# Patient Record
Sex: MALE | Race: WHITE | Employment: OTHER | URBAN - METROPOLITAN AREA
[De-identification: names, ages, dates, MRNs, and addresses within clinical notes are randomized per-mention and may not be internally consistent; named-entity substitution may affect disease eponyms.]

---

## 2018-07-16 ENCOUNTER — HOSPITAL ENCOUNTER (EMERGENCY)
Facility: CLINIC | Age: 53
Discharge: HOME | End: 2018-07-16
Attending: EMERGENCY MEDICINE | Admitting: EMERGENCY MEDICINE

## 2018-07-16 VITALS
RESPIRATION RATE: 12 BRPM | TEMPERATURE: 97.8 F | DIASTOLIC BLOOD PRESSURE: 75 MMHG | SYSTOLIC BLOOD PRESSURE: 125 MMHG | OXYGEN SATURATION: 96 % | HEART RATE: 75 BPM

## 2018-07-16 DIAGNOSIS — T78.40XA ALLERGIC REACTION, INITIAL ENCOUNTER: ICD-10-CM

## 2018-07-16 DIAGNOSIS — T63.441A BEE STING REACTION, ACCIDENTAL OR UNINTENTIONAL, INITIAL ENCOUNTER: ICD-10-CM

## 2018-07-16 LAB
ALBUMIN SERPL-MCNC: 4.1 G/DL (ref 3.4–5)
ALP SERPL-CCNC: 95 U/L (ref 40–150)
ALT SERPL W P-5'-P-CCNC: 28 U/L (ref 0–70)
ANION GAP SERPL CALCULATED.3IONS-SCNC: 9 MMOL/L (ref 3–14)
AST SERPL W P-5'-P-CCNC: 23 U/L (ref 0–45)
BASOPHILS # BLD AUTO: 0.1 10E9/L (ref 0–0.2)
BASOPHILS NFR BLD AUTO: 1 %
BILIRUB SERPL-MCNC: 0.9 MG/DL (ref 0.2–1.3)
BUN SERPL-MCNC: 22 MG/DL (ref 7–30)
CALCIUM SERPL-MCNC: 8.9 MG/DL (ref 8.5–10.1)
CHLORIDE SERPL-SCNC: 109 MMOL/L (ref 94–109)
CO2 SERPL-SCNC: 23 MMOL/L (ref 20–32)
CREAT SERPL-MCNC: 1.05 MG/DL (ref 0.66–1.25)
DIFFERENTIAL METHOD BLD: ABNORMAL
EOSINOPHIL # BLD AUTO: 0 10E9/L (ref 0–0.7)
EOSINOPHIL NFR BLD AUTO: 0 %
ERYTHROCYTE [DISTWIDTH] IN BLOOD BY AUTOMATED COUNT: 13.2 % (ref 10–15)
GFR SERPL CREATININE-BSD FRML MDRD: 73 ML/MIN/1.7M2
GLUCOSE SERPL-MCNC: 140 MG/DL (ref 70–99)
HCT VFR BLD AUTO: 43.6 % (ref 40–53)
HGB BLD-MCNC: 14.4 G/DL (ref 13.3–17.7)
LYMPHOCYTES # BLD AUTO: 5.4 10E9/L (ref 0.8–5.3)
LYMPHOCYTES NFR BLD AUTO: 46 %
MCH RBC QN AUTO: 31.3 PG (ref 26.5–33)
MCHC RBC AUTO-ENTMCNC: 33 G/DL (ref 31.5–36.5)
MCV RBC AUTO: 95 FL (ref 78–100)
MONOCYTES # BLD AUTO: 0.6 10E9/L (ref 0–1.3)
MONOCYTES NFR BLD AUTO: 5 %
NEUTROPHILS # BLD AUTO: 5.6 10E9/L (ref 1.6–8.3)
NEUTROPHILS NFR BLD AUTO: 48 %
NT-PROBNP SERPL-MCNC: 151 PG/ML (ref 0–900)
PLATELET # BLD AUTO: 270 10E9/L (ref 150–450)
PLATELET # BLD EST: ABNORMAL 10*3/UL
POTASSIUM SERPL-SCNC: 3.2 MMOL/L (ref 3.4–5.3)
PROT SERPL-MCNC: 7.8 G/DL (ref 6.8–8.8)
RBC # BLD AUTO: 4.6 10E12/L (ref 4.4–5.9)
RBC MORPH BLD: ABNORMAL
SODIUM SERPL-SCNC: 141 MMOL/L (ref 133–144)
TROPONIN I SERPL-MCNC: <0.015 UG/L (ref 0–0.04)
WBC # BLD AUTO: 11.7 10E9/L (ref 4–11)

## 2018-07-16 PROCEDURE — 99284 EMERGENCY DEPT VISIT MOD MDM: CPT | Mod: 25 | Performed by: EMERGENCY MEDICINE

## 2018-07-16 PROCEDURE — 83880 ASSAY OF NATRIURETIC PEPTIDE: CPT | Performed by: EMERGENCY MEDICINE

## 2018-07-16 PROCEDURE — 84484 ASSAY OF TROPONIN QUANT: CPT | Performed by: EMERGENCY MEDICINE

## 2018-07-16 PROCEDURE — 96365 THER/PROPH/DIAG IV INF INIT: CPT

## 2018-07-16 PROCEDURE — 25000128 H RX IP 250 OP 636: Performed by: EMERGENCY MEDICINE

## 2018-07-16 PROCEDURE — 25000125 ZZHC RX 250: Performed by: EMERGENCY MEDICINE

## 2018-07-16 PROCEDURE — 93005 ELECTROCARDIOGRAM TRACING: CPT

## 2018-07-16 PROCEDURE — 85025 COMPLETE CBC W/AUTO DIFF WBC: CPT | Performed by: EMERGENCY MEDICINE

## 2018-07-16 PROCEDURE — 93010 ELECTROCARDIOGRAM REPORT: CPT | Mod: Z6 | Performed by: EMERGENCY MEDICINE

## 2018-07-16 PROCEDURE — 80053 COMPREHEN METABOLIC PANEL: CPT | Performed by: EMERGENCY MEDICINE

## 2018-07-16 PROCEDURE — 99284 EMERGENCY DEPT VISIT MOD MDM: CPT | Mod: 25

## 2018-07-16 PROCEDURE — 96375 TX/PRO/DX INJ NEW DRUG ADDON: CPT

## 2018-07-16 RX ORDER — DIPHENHYDRAMINE HYDROCHLORIDE 50 MG/ML
50 INJECTION INTRAMUSCULAR; INTRAVENOUS ONCE
Status: COMPLETED | OUTPATIENT
Start: 2018-07-16 | End: 2018-07-16

## 2018-07-16 RX ORDER — ONDANSETRON 2 MG/ML
4 INJECTION INTRAMUSCULAR; INTRAVENOUS EVERY 30 MIN PRN
Status: DISCONTINUED | OUTPATIENT
Start: 2018-07-16 | End: 2018-07-16 | Stop reason: HOSPADM

## 2018-07-16 RX ORDER — EPINEPHRINE 0.3 MG/.3ML
0.3 INJECTION SUBCUTANEOUS
Qty: 0.6 ML | Refills: 1 | Status: SHIPPED | OUTPATIENT
Start: 2018-07-16

## 2018-07-16 RX ORDER — METHYLPREDNISOLONE SODIUM SUCCINATE 125 MG/2ML
125 INJECTION, POWDER, LYOPHILIZED, FOR SOLUTION INTRAMUSCULAR; INTRAVENOUS ONCE
Status: COMPLETED | OUTPATIENT
Start: 2018-07-16 | End: 2018-07-16

## 2018-07-16 RX ADMIN — FAMOTIDINE 20 MG: 20 INJECTION, SOLUTION INTRAVENOUS at 13:19

## 2018-07-16 RX ADMIN — DIPHENHYDRAMINE HYDROCHLORIDE 50 MG: 50 INJECTION, SOLUTION INTRAMUSCULAR; INTRAVENOUS at 13:08

## 2018-07-16 RX ADMIN — METHYLPREDNISOLONE SODIUM SUCCINATE 125 MG: 125 INJECTION, POWDER, FOR SOLUTION INTRAMUSCULAR; INTRAVENOUS at 13:12

## 2018-07-16 ASSESSMENT — ENCOUNTER SYMPTOMS
VOMITING: 0
SHORTNESS OF BREATH: 0
WHEEZING: 0
NAUSEA: 1

## 2018-07-16 NOTE — ED PROVIDER NOTES
History     Chief Complaint   Patient presents with     Allergic Reaction     bee sting, used epi pen, chest and loss of vision reported on arrival, stung 1 hour epi pen used prior to arrival 15 min ago     HPI  Norman Grijalva is a 52 year old male who presents to the Emergency Department with concern for possible allergic reaction. Patient was stung by a bee just prior to arrival between the 4th and 5th finger on the right hand. Patient has a known history of allergy to bees. He had temporary tunnel vision when he was first stung, and then administered his epi-pen around 12:45 pm, with improvement in the vision change. He reports feeling thirsty, with a dry mouth and complains of numbness in his tongue and lips. He denies throat swelling, shortness of breath, difficulty speaking or swallowing. He notes a mild mid-sternal chest pressure which is improving and mild nausea. Overall patient notes improvement since administering epi-pen.     Problem List:    Patient Active Problem List    Diagnosis Date Noted     CARDIOVASCULAR SCREENING; LDL GOAL LESS THAN 130 09/18/2013     Priority: Medium        Past Medical History:    No past medical history on file.    Past Surgical History:    Past Surgical History:   Procedure Laterality Date     CHOLECYSTECTOMY  2004       Family History:    Family History   Problem Relation Age of Onset     Diabetes Father        Social History:  Marital Status:  Single [1]  Social History   Substance Use Topics     Smoking status: Current Every Day Smoker     Packs/day: 0.50     Smokeless tobacco: Never Used     Alcohol use No        Medications:      NO ACTIVE MEDICATIONS       Review of Systems   Constitutional: Positive for appetite change. Negative for chills and fever.   HENT: Negative for congestion, facial swelling and trouble swallowing.    Eyes: Positive for visual disturbance (temporary tunnel vision, resolved).   Respiratory: Negative for shortness of breath and wheezing.     Cardiovascular: Negative for leg swelling.        Mid-sternal chest pressure   Gastrointestinal: Positive for nausea. Negative for abdominal pain and vomiting.   Genitourinary: Negative for decreased urine volume and dysuria.   Musculoskeletal: Negative for back pain, myalgias and neck pain.   Skin: Negative for rash.   Allergic/Immunologic: Positive for environmental allergies (bee sting).   Neurological: Negative for dizziness, weakness, light-headedness, numbness and headaches.   Hematological: Does not bruise/bleed easily.   Psychiatric/Behavioral: Negative for confusion.       Physical Exam   BP: 122/73  Pulse: 75  Temp: 97.8  F (36.6  C)  Resp: 16  SpO2: 93 %    Physical Exam   Constitutional: He appears well-developed and well-nourished.   Patient appears anxious   Psychiatric: He has a normal mood and affect.   Nursing note and vitals reviewed.    HENT: Oral mucosa moist. No lesions. Posterior pharynx with no erythema or edema.   Neck: Supple, no swelling is noted.   Pulmonary/Chest: Lungs are clear to auscultation bilaterally, slightly decreased at bases.   Cardiovascular: Heart is regular rate and rhythm. No murmur.  Abdomen: Soft, non-distended, non-tender.   Musculoskeletal: Moving all extremities well. No peripheral edema. Bee sting site noted between 3rd and 4th digit on right hand without evidence of foreign body.   Neurological: Alert. No focal neurologic deficit. CN II-XII intact.  Skin: No rash.    ED Course     ED Course     Procedures               Critical Care time: none          EKG Interpretation:      Interpreted by Kendrick Barajas  Rhythm: Normal sinus   Rate: Normal  Axis: Normal  Ectopy: None  Conduction: Normal  ST Segments/ T Waves: No ST-T wave changes and No acute ischemic changes  Q Waves: None  Comparison to prior: No old EKG available    Clinical Impression: normal EKG              Labs Ordered and Resulted from Time of ED Arrival Up to the Time of Departure from the ED    CBC WITH PLATELETS DIFFERENTIAL - Abnormal; Notable for the following:        Result Value    WBC 11.7 (*)     Absolute Lymphocytes 5.4 (*)     All other components within normal limits   COMPREHENSIVE METABOLIC PANEL - Abnormal; Notable for the following:     Potassium 3.2 (*)     Glucose 140 (*)     All other components within normal limits   NT PROBNP INPATIENT   TROPONIN I       Medications   diphenhydrAMINE (BENADRYL) injection 50 mg (50 mg Intravenous Given 7/16/18 1308)   methylPREDNISolone sodium succinate (solu-MEDROL) injection 125 mg (125 mg Intravenous Given 7/16/18 1312)       1:03 PM Patient assessed     Assessments & Plan (with Medical Decision Making) Records were reviewed. Labs were obtained and patient was given benadryl and solumedrol. White count was mildly elevated . Comprehensive metabolic panel with acute abnormality. BNP was within normal limits and troponin was negative. Due to initial chest pressure an ekg was obtained and was unremarkable. Patient was observed for 2 hour with relief of symptoms. He is requesting to go home at thist time. On re-exam he has no throat swelling his lungs are clear and there is stable vitals sign . He has not rash. She will continue with benadryl and return if symptoms worsen or new symptoms develop.      I have reviewed the nursing notes.    I have reviewed the findings, diagnosis, plan and need for follow up with the patient.       Discharge Medication List as of 7/16/2018  3:02 PM      START taking these medications    Details   EPINEPHrine (EPIPEN/ADRENACLICK/OR ANY BX GENERIC EQUIV) 0.3 MG/0.3ML injection 2-pack Inject 0.3 mLs (0.3 mg) into the muscle once as needed for anaphylaxis, Disp-0.6 mL, R-1, Local Print             Final diagnoses:   Bee sting reaction, accidental or unintentional, initial encounter   Allergic reaction, initial encounter     This document serves as a record of the services and decisions personally performed and made by Jenn  Kendrick Montes MD. It was created on his behalf by Gabby Castillo, a trained medical scribe. The creation of this document is based the provider's statements to the medical scribe.  Gabby Castillo 1:21 PM 7/16/2018    Provider:   The information in this document, created by the medical scribe for me, accurately reflects the services I personally performed and the decisions made by me. I have reviewed and approved this document for accuracy prior to leaving the patient care area.  Kendrick Barajas MD 1:21 PM 7/16/2018 7/16/2018   [facility] EMERGENCY DEPARTMENT     Kendrick Barajas MD  07/17/18 7598

## 2018-07-16 NOTE — DISCHARGE INSTRUCTIONS
"Return if symptoms worsen or new symptoms develop.  Follow-up with primary care physician next available.  Drink plenty of fluids.  Take Benadryl as needed for itching and reactive further reaction.  If increased shortness of breath throat swelling or other symptoms present please return for recheck.  Use EpiPen as needed.  Insect, Spider, and Scorpion Bites and Stings  Most insect bites are harmless and cause only minor swelling or itching. But if you re allergic to insects such as wasps or bees, a sting can cause a life-threatening allergic reaction. Some ticks can carry and transmit serious diseases. The venom (poison) from scorpions and certain spiders can also be deadly, although this is rare. Knowing when to seek emergency care could save your life.     The black  (top) and brown recluse (bottom) are two poisonous spiders found in the [place].   When to go to the emergency room (ER)    Scorpion sting    Bite from a black, red, or brown  spider or brown recluse spider    Severe pain or swelling at the site of bite    A tick that is embedded in your skin and can not be easily removed at home    Signs of an allergic reaction such as:  ? Hives  ? Swelling of your eyes, lips, or the inside of your throat  ? Trouble breathing  ? Dizziness or confusion  What to expect in the ER    If you re having trouble breathing, you ll be given oxygen through a mask. In case of severe breathing difficulty, you may have a tube inserted in your throat and be placed on a ventilator (breathing machine).    If you are having a severe allergic reaction from a sting (called anaphylaxis), you may be given a shot of epinephrine. If it is known that you are allergic to bee or wasp stings, your doctor may give you a prescription for an \"epi-pen\" that you can keep with you at all times in case of a sting.    You may receive antivenin (a substance that reverses the effects of poison) for some spider bites and scorpion stings. " Because antivenin can sometimes cause other problems, your doctor will weigh the risks and benefits of this treatment.    Steroids such as prednisone are often used to treat allergic reactions. In many cases, your doctor will also prescribe an antihistamine to help relieve itching.  Easing symptoms of an insect bite or sting    Try to remove a stinger you can see. Use your fingernail, a knife edge, or credit card to scrape against the skin. Do not squeeze or pull.    Apply ice or a cold compress to reduce pain and swelling (keep a thin cloth between the cold source and the skin).   Date Last Reviewed: 12/1/2016 2000-2017 [employer]. [address] [place] [address], [place] [address]. All rights reserved. This information is not intended as a substitute for professional medical care. Always follow your healthcare professional's instructions.

## 2018-07-16 NOTE — ED AVS SNAPSHOT
[facility] Emergency Department    [address] [address]    Phone:  [phone]    Fax:  [phone]                                       Norman Grijalva   MRN: [id number]    Department:  [facility] Emergency Department   Date of Visit:  7/16/2018           After Visit Summary Signature Page     I have received my discharge instructions, and my questions have been answered. I have discussed any challenges I see with this plan with the nurse or doctor.    ..........................................................................................................................................  Patient/Patient Representative Signature      ..........................................................................................................................................  Patient Representative Print Name and Relationship to Patient    ..................................................               ................................................  Date                                            Time    ..........................................................................................................................................  Reviewed by Signature/Title    ...................................................              ..............................................  Date                                                            Time

## 2018-07-16 NOTE — ED AVS SNAPSHOT
[facility] Emergency Department    [address] [address]    Phone:  [phone]    Fax:  [phone]                                       Norman Grijalva   MRN: [id number]    Department:  [facility] Emergency Department   Date of Visit:  7/16/2018           Patient Information     Date Of Birth          1965        Your diagnoses for this visit were:     Bee sting reaction, accidental or unintentional, initial encounter     Allergic reaction, initial encounter        You were seen by Kendrick Barajas MD.      Follow-up Information     Follow up with Willard Reynoso MD.    Specialty:  Family Practice    Why:  Later this week for recheck    Contact information:    [address] [facility] [address] [phone]          Follow up with [facility] Emergency Department.    Specialty:  EMERGENCY MEDICINE    Why:  If symptoms worsen    Contact information:    [address] [place] [id number] [phone]    Additional information:    The medical center is located at   [address] [place] [address]. (between [address]   [address] in [place], four miles north   of [place]).        Discharge Instructions       Return if symptoms worsen or new symptoms develop.  Follow-up with primary care physician next available.  Drink plenty of fluids.  Take Benadryl as needed for itching and reactive further reaction.  If increased shortness of breath throat swelling or other symptoms present please return for recheck.  Use EpiPen as needed.  Insect, Spider, and Scorpion Bites and Stings  Most insect bites are harmless and cause only minor swelling or itching. But if you re allergic to insects such as wasps or bees, a sting can cause a life-threatening allergic reaction. Some ticks can carry and transmit serious diseases. The venom (poison) from scorpions and certain spiders can also be deadly, although this is rare. Knowing when to seek emergency care could  "save your life.     The black  (top) and brown recluse (bottom) are two poisonous spiders found in the [place].   When to go to the emergency room (ER)    Scorpion sting    Bite from a black, red, or brown  spider or brown recluse spider    Severe pain or swelling at the site of bite    A tick that is embedded in your skin and can not be easily removed at home    Signs of an allergic reaction such as:  ? Hives  ? Swelling of your eyes, lips, or the inside of your throat  ? Trouble breathing  ? Dizziness or confusion  What to expect in the ER    If you re having trouble breathing, you ll be given oxygen through a mask. In case of severe breathing difficulty, you may have a tube inserted in your throat and be placed on a ventilator (breathing machine).    If you are having a severe allergic reaction from a sting (called anaphylaxis), you may be given a shot of epinephrine. If it is known that you are allergic to bee or wasp stings, your doctor may give you a prescription for an \"epi-pen\" that you can keep with you at all times in case of a sting.    You may receive antivenin (a substance that reverses the effects of poison) for some spider bites and scorpion stings. Because antivenin can sometimes cause other problems, your doctor will weigh the risks and benefits of this treatment.    Steroids such as prednisone are often used to treat allergic reactions. In many cases, your doctor will also prescribe an antihistamine to help relieve itching.  Easing symptoms of an insect bite or sting    Try to remove a stinger you can see. Use your fingernail, a knife edge, or credit card to scrape against the skin. Do not squeeze or pull.    Apply ice or a cold compress to reduce pain and swelling (keep a thin cloth between the cold source and the skin).   Date Last Reviewed: 12/1/2016 2000-2017 [employer]. [address] [place] [address], [place] [address]. All rights reserved. This information is not " intended as a substitute for professional medical care. Always follow your healthcare professional's instructions.          Discharge References/Attachments     ALLERGIC REACTION, INSECT (GENERAL) (ENGLISH)      24 Hour Appointment Hotline       To make an appointment at any [facility] clinic, call [phone] ([phone]). If you don't have a family doctor or clinic, we will help you find one. [facility] clinics are conveniently located to serve the needs of you and your family.             Review of your medicines      START taking        Dose / Directions Last dose taken    EPINEPHrine 0.3 MG/0.3ML injection 2-pack   Commonly known as:  EPIPEN/ADRENACLICK/or ANY BX GENERIC EQUIV   Dose:  0.3 mg   Quantity:  0.6 mL        Inject 0.3 mLs (0.3 mg) into the muscle once as needed for anaphylaxis   Refills:  1          STOP taking        Dose Reason for stopping Comments    NO ACTIVE MEDICATIONS                      Prescriptions were sent or printed at these locations (1 Prescription)                   Other Prescriptions                Printed at Department/Unit printer (1 of 1)         EPINEPHrine (EPIPEN/ADRENACLICK/OR ANY BX GENERIC EQUIV) 0.3 MG/0.3ML injection 2-pack                Procedures and tests performed during your visit     CBC with platelets, differential    Comprehensive metabolic panel    EKG 12 lead    NT pro BNP    Troponin I      Orders Needing Specimen Collection     None      Pending Results     No orders found from 7/14/2018 to 7/17/2018.            Pending Culture Results     No orders found from 7/14/2018 to 7/17/2018.            Pending Results Instructions     If you had any lab results that were not finalized at the time of your Discharge, you can call the ED Lab Result RN at [phone]. You will be contacted by this team for any positive Lab results or changes in treatment. The nurses are available 7 days a week from 10A to 6:30P.  You can leave a message 24 hours per day and they  will return your call.        Test Results From Your Hospital Stay        7/16/2018  2:03 PM      Component Results     Component Value Ref Range & Units Status    WBC 11.7 (H) 4.0 - 11.0 10e9/L Final    RBC Count 4.60 4.4 - 5.9 10e12/L Final    Hemoglobin 14.4 13.3 - 17.7 g/dL Final    Hematocrit 43.6 40.0 - 53.0 % Final    MCV 95 78 - 100 fl Final    MCH 31.3 26.5 - 33.0 pg Final    MCHC 33.0 31.5 - 36.5 g/dL Final    RDW 13.2 10.0 - 15.0 % Final    Platelet Count 270 150 - 450 10e9/L Final    Diff Method Manual Differential  Final    % Neutrophils 48.0 % Final    % Lymphocytes 46.0 % Final    % Monocytes 5.0 % Final    % Eosinophils 0.0 % Final    % Basophils 1.0 % Final    Absolute Neutrophil 5.6 1.6 - 8.3 10e9/L Final    Absolute Lymphocytes 5.4 (H) 0.8 - 5.3 10e9/L Final    Absolute Monocytes 0.6 0.0 - 1.3 10e9/L Final    Absolute Eosinophils 0.0 0.0 - 0.7 10e9/L Final    Absolute Basophils 0.1 0.0 - 0.2 10e9/L Final    RBC Morphology   Final    Consistent with reported results    Platelet Estimate   Final    Automated count confirmed.  Platelet morphology is normal.         7/16/2018  1:54 PM      Component Results     Component Value Ref Range & Units Status    Sodium 141 133 - 144 mmol/L Final    Potassium 3.2 (L) 3.4 - 5.3 mmol/L Final    Chloride 109 94 - 109 mmol/L Final    Carbon Dioxide 23 20 - 32 mmol/L Final    Anion Gap 9 3 - 14 mmol/L Final    Glucose 140 (H) 70 - 99 mg/dL Final    Urea Nitrogen 22 7 - 30 mg/dL Final    Creatinine 1.05 0.66 - 1.25 mg/dL Final    GFR Estimate 73 >60 mL/min/1.7m2 Final    Non  GFR Calc    GFR Estimate If Black 89 >60 mL/min/1.7m2 Final    African American GFR Calc    Calcium 8.9 8.5 - 10.1 mg/dL Final    Bilirubin Total 0.9 0.2 - 1.3 mg/dL Final    Albumin 4.1 3.4 - 5.0 g/dL Final    Protein Total 7.8 6.8 - 8.8 g/dL Final    Alkaline Phosphatase 95 40 - 150 U/L Final    ALT 28 0 - 70 U/L Final    AST 23 0 - 45 U/L Final         7/16/2018  1:54 PM     "  Component Results     Component Value Ref Range & Units Status    N-Terminal Pro BNP Inpatient 151 0 - 900 pg/mL Final       Reference range shown and results flagged as abnormal are suggested inpatient   cut points for confirming diagnosis if CHF in an acute setting. Establishing a   baseline value for each individual patient is useful for follow-up. An   inpatient or emergency department NT-proPBNP <300 pg/mL effectively rules out   acute CHF, with 99% negative predictive value.  The outpatient non-acute reference range for ruling out CHF is:   0-125 pg/mL (age 18 to less than 75)   0-450 pg/mL (age 75 yrs and older)           2018  1:54 PM      Component Results     Component Value Ref Range & Units Status    Troponin I ES <0.015 0.000 - 0.045 ug/L Final    The 99th percentile for upper reference range is 0.045 ug/L.  Troponin values   in the range of 0.045 - 0.120 ug/L may be associated with risks of adverse   clinical events.                  Thank you for choosing [facility]       Thank you for choosing [facility] for your care. Our goal is always to provide you with excellent care. Hearing back from our patients is one way we can continue to improve our services. Please take a few minutes to complete the written survey that you may receive in the mail after you visit with us. Thank you!        [employer] Information     b3 bio lets you send messages to your doctor, view your test results, renew your prescriptions, schedule appointments and more. To sign up, go to [email or web address] . Click on \"Log in\" on the left side of the screen, which will take you to the Welcome page. Then click on \"Sign up Now\" on the right side of the page.     You will be asked to enter the access code listed below, as well as some personal information. Please follow the directions to create your username and password.     Your access code is: [email or web address]  Expires: 10/14/2018  3:01 PM     Your access code will  in 90 " days. If you need help or a new code, please call your [facility] clinic or [phone].        Care EveryWhere ID     This is your Care EveryWhere ID. This could be used by other organizations to access your [facility] medical records  [id number]        Equal Access to Services     LINO DOUGLAS : Michelle Vaughan, wageethada luqadaha, qaybcaleb kaalmarafia bell, rach lott. So [facility] [phone].    ATENCIÓN: Si habla español, tiene a hernandez disposición servicios gratuitos de asistencia lingüística. Llame al [phone].    We comply with applicable federal civil rights laws and [place] laws. We do not discriminate on the basis of race, color, national origin, age, disability, sex, sexual orientation, or gender identity.            After Visit Summary       This is your record. Keep this with you and show to your community pharmacist(s) and doctor(s) at your next visit.

## 2018-07-17 ASSESSMENT — ENCOUNTER SYMPTOMS
WEAKNESS: 0
ABDOMINAL PAIN: 0
CONFUSION: 0
NUMBNESS: 0
NECK PAIN: 0
MYALGIAS: 0
BRUISES/BLEEDS EASILY: 0
FEVER: 0
CHILLS: 0
APPETITE CHANGE: 1
BACK PAIN: 0
HEADACHES: 0
DYSURIA: 0
FACIAL SWELLING: 0
LIGHT-HEADEDNESS: 0
TROUBLE SWALLOWING: 0
DIZZINESS: 0